# Patient Record
Sex: FEMALE | Race: WHITE | NOT HISPANIC OR LATINO | Employment: STUDENT | URBAN - METROPOLITAN AREA
[De-identification: names, ages, dates, MRNs, and addresses within clinical notes are randomized per-mention and may not be internally consistent; named-entity substitution may affect disease eponyms.]

---

## 2017-08-23 DIAGNOSIS — Z01.810 PRE-OPERATIVE CARDIOVASCULAR EXAMINATION: ICD-10-CM

## 2017-08-23 DIAGNOSIS — Z01.812 PRE-OPERATIVE LABORATORY EXAMINATION: ICD-10-CM

## 2017-08-23 LAB
ALBUMIN SERPL BCP-MCNC: 4.1 G/DL (ref 3.2–4.9)
ALBUMIN/GLOB SERPL: 1.3 G/DL
ALP SERPL-CCNC: 65 U/L (ref 30–99)
ALT SERPL-CCNC: 37 U/L (ref 2–50)
ANION GAP SERPL CALC-SCNC: 7 MMOL/L (ref 0–11.9)
APPEARANCE UR: CLEAR
AST SERPL-CCNC: 30 U/L (ref 12–45)
BASOPHILS # BLD AUTO: 0.4 % (ref 0–1.8)
BASOPHILS # BLD: 0.02 K/UL (ref 0–0.12)
BILIRUB SERPL-MCNC: 0.4 MG/DL (ref 0.1–1.5)
BILIRUB UR QL STRIP.AUTO: NEGATIVE
BUN SERPL-MCNC: 14 MG/DL (ref 8–22)
CALCIUM SERPL-MCNC: 9.7 MG/DL (ref 8.5–10.5)
CHLORIDE SERPL-SCNC: 106 MMOL/L (ref 96–112)
CO2 SERPL-SCNC: 25 MMOL/L (ref 20–33)
COLOR UR: YELLOW
CREAT SERPL-MCNC: 0.87 MG/DL (ref 0.5–1.4)
CULTURE IF INDICATED INDCX: NO UA CULTURE
EKG IMPRESSION: NORMAL
EOSINOPHIL # BLD AUTO: 0.09 K/UL (ref 0–0.51)
EOSINOPHIL NFR BLD: 1.8 % (ref 0–6.9)
ERYTHROCYTE [DISTWIDTH] IN BLOOD BY AUTOMATED COUNT: 46.6 FL (ref 35.9–50)
GFR SERPL CREATININE-BSD FRML MDRD: >60 ML/MIN/1.73 M 2
GLOBULIN SER CALC-MCNC: 3.2 G/DL (ref 1.9–3.5)
GLUCOSE SERPL-MCNC: 90 MG/DL (ref 65–99)
GLUCOSE UR STRIP.AUTO-MCNC: NEGATIVE MG/DL
HCT VFR BLD AUTO: 44.2 % (ref 37–47)
HGB BLD-MCNC: 14.8 G/DL (ref 12–16)
IMM GRANULOCYTES # BLD AUTO: 0.01 K/UL (ref 0–0.11)
IMM GRANULOCYTES NFR BLD AUTO: 0.2 % (ref 0–0.9)
KETONES UR STRIP.AUTO-MCNC: NEGATIVE MG/DL
LEUKOCYTE ESTERASE UR QL STRIP.AUTO: NEGATIVE
LYMPHOCYTES # BLD AUTO: 1.84 K/UL (ref 1–4.8)
LYMPHOCYTES NFR BLD: 37.5 % (ref 22–41)
MCH RBC QN AUTO: 32 PG (ref 27–33)
MCHC RBC AUTO-ENTMCNC: 33.5 G/DL (ref 33.6–35)
MCV RBC AUTO: 95.7 FL (ref 81.4–97.8)
MICRO URNS: NORMAL
MONOCYTES # BLD AUTO: 0.66 K/UL (ref 0–0.85)
MONOCYTES NFR BLD AUTO: 13.4 % (ref 0–13.4)
NEUTROPHILS # BLD AUTO: 2.29 K/UL (ref 2–7.15)
NEUTROPHILS NFR BLD: 46.7 % (ref 44–72)
NITRITE UR QL STRIP.AUTO: NEGATIVE
NRBC # BLD AUTO: 0 K/UL
NRBC BLD AUTO-RTO: 0 /100 WBC
PH UR STRIP.AUTO: 6 [PH]
PLATELET # BLD AUTO: 184 K/UL (ref 164–446)
PMV BLD AUTO: 10 FL (ref 9–12.9)
POTASSIUM SERPL-SCNC: 4 MMOL/L (ref 3.6–5.5)
PROT SERPL-MCNC: 7.3 G/DL (ref 6–8.2)
PROT UR QL STRIP: NEGATIVE MG/DL
RBC # BLD AUTO: 4.62 M/UL (ref 4.2–5.4)
RBC UR QL AUTO: NEGATIVE
SODIUM SERPL-SCNC: 138 MMOL/L (ref 135–145)
SP GR UR STRIP.AUTO: 1.02
UROBILINOGEN UR STRIP.AUTO-MCNC: 0.2 MG/DL
WBC # BLD AUTO: 4.9 K/UL (ref 4.8–10.8)

## 2017-08-23 PROCEDURE — 85025 COMPLETE CBC W/AUTO DIFF WBC: CPT

## 2017-08-23 PROCEDURE — 93005 ELECTROCARDIOGRAM TRACING: CPT

## 2017-08-23 PROCEDURE — 81003 URINALYSIS AUTO W/O SCOPE: CPT

## 2017-08-23 PROCEDURE — 80053 COMPREHEN METABOLIC PANEL: CPT

## 2017-08-23 PROCEDURE — 36415 COLL VENOUS BLD VENIPUNCTURE: CPT

## 2017-08-23 PROCEDURE — 93010 ELECTROCARDIOGRAM REPORT: CPT | Performed by: INTERNAL MEDICINE

## 2017-08-23 RX ORDER — M-VIT,TX,IRON,MINS/CALC/FOLIC 27MG-0.4MG
1 TABLET ORAL DAILY
COMMUNITY

## 2017-08-23 RX ORDER — LEVOCETIRIZINE DIHYDROCHLORIDE 5 MG/1
TABLET, FILM COATED ORAL DAILY
COMMUNITY

## 2017-08-23 RX ORDER — CETIRIZINE HYDROCHLORIDE 10 MG/1
10 TABLET ORAL DAILY
COMMUNITY

## 2017-09-05 NOTE — H&P
DATE OF ADMISSION:  2017    HISTORY OF PRESENT ILLNESS:  1.  The patient is a  46-year-old female who is currently being   admitted for a total laparoscopic hysterectomy, bilateral salpingectomy with   preservation of her ovaries.  Indications for surgery are debilitating cramps   of years' duration.  2.  Abnormal uterine bleeding, heavy menorrhagia and heavy flow and frequent   spotting throughout her cycle.  She had dysfunctional bleeding, dysmenorrhea   since the ultrasound showing probable adenomyosis.  Uterus is enlarged as   well.  Measurements on the uterus by ultrasound were 13.3x6.2x6.8.  Patient   has had 8 pregnancies and 5 deliveries.  Ovaries appeared normal on   ultrasound.  Risks of surgery have been discussed at length and in detail   including the possibility of bleeding, infection, damage to adjacent   structures, bowel, bladder, ureter, major blood vessels, possibility of   transfusion, anesthetic risk, thromboembolic risk.  Because of the suggestion   of adenomyosis, the possibility of endometriosis was discussed as well and if   there is severe endometriosis, ovaries would be removed.  Patient has   consented to this.  She only wants her ovaries removed; however, there are   special circumstances such as extreme endometriosis.  Transfusion, anesthetic   risk, and thromboembolic risk have also been discussed.  Possibility of   laparotomy has been discussed.  She had a previous appendectomy and ovarian   cystectomy to her right lower quadrant incision.  She has also had previous   tubal ligation.  She denies other abdominal surgeries.  She delivered 5   children.    PAST MEDICAL  HISTORY:  Significant really just for her surgeries,   appendectomy, cystectomy, tonsillectomy, tubal ligation.    REPRODUCTIVE HISTORY:   8, para 5.    FAMILY HISTORY:  Colon cancer, heart disease, stroke.    REVIEW OF SYSTEMS:  Otherwise, unremarkable.    PHYSICAL EXAMINATION:  GENERAL:   Well-developed, pleasant female, in no acute distress.  HEENT:  Normocephalic.  EOMs intact.  Pupils reactive to light.  Throat clear.  NECK:  Supple without adenopathy.  CHEST:  Clear to auscultation.  CARDIAC:  Regular rhythm, no murmurs.  BREASTS:  No masses, no dimpling, no retraction or axillary adenopathy.  ABDOMEN:  Soft, nontender.  No organomegaly.  EXTREMITIES:  Normal.  PELVIC:  Marital introitus, grade I rectocele, grade II cystocele.  Uterus is   enlarged.  No distinct adnexal masses were appreciated.    IMPRESSION:  Abnormal uterine bleeding, dysmenorrhea, suspected adenomyosis,   possible endometriosis, pelvic pain.    PLAN:  Patient is going to undergo the above referenced surgery, total   laparoscopic hysterectomy using da Bronson robotic technology.  Alternatives   have been discussed including IUD and ablation.  The patient wishes definitive   therapy.  Ovaries will only be removed if endometriosis is significant.       ____________________________________     MD SHALONDA BALDERAS / SNEHAL    DD:  09/05/2017 09:19:57  DT:  09/05/2017 11:34:41    D#:  [id number]  Job#:  [id number]

## 2017-09-06 ENCOUNTER — HOSPITAL ENCOUNTER (OUTPATIENT)
Facility: MEDICAL CENTER | Age: 47
End: 2017-09-06
Attending: OBSTETRICS & GYNECOLOGY | Admitting: OBSTETRICS & GYNECOLOGY
Payer: OTHER GOVERNMENT

## 2017-09-06 VITALS
RESPIRATION RATE: 16 BRPM | DIASTOLIC BLOOD PRESSURE: 57 MMHG | BODY MASS INDEX: 24.95 KG/M2 | WEIGHT: 168.43 LBS | SYSTOLIC BLOOD PRESSURE: 112 MMHG | HEART RATE: 62 BPM | OXYGEN SATURATION: 97 % | HEIGHT: 69 IN | TEMPERATURE: 97.3 F

## 2017-09-06 PROBLEM — N92.0 MENORRHAGIA: Status: ACTIVE | Noted: 2017-09-06

## 2017-09-06 LAB
B-HCG FREE SERPL-ACNC: <5 MIU/ML
HCG UR QL: NEGATIVE
IHCGL IHCGL: NEGATIVE MIU/ML
SP GR UR REFRACTOMETRY: 1.01

## 2017-09-06 PROCEDURE — 502240 HCHG MISC OR SUPPLY RC 0272: Performed by: OBSTETRICS & GYNECOLOGY

## 2017-09-06 PROCEDURE — 84702 CHORIONIC GONADOTROPIN TEST: CPT

## 2017-09-06 PROCEDURE — 160048 HCHG OR STATISTICAL LEVEL 1-5: Performed by: OBSTETRICS & GYNECOLOGY

## 2017-09-06 PROCEDURE — 81025 URINE PREGNANCY TEST: CPT

## 2017-09-06 PROCEDURE — 160035 HCHG PACU - 1ST 60 MINS PHASE I: Performed by: OBSTETRICS & GYNECOLOGY

## 2017-09-06 PROCEDURE — 160031 HCHG SURGERY MINUTES - 1ST 30 MINS LEVEL 5: Performed by: OBSTETRICS & GYNECOLOGY

## 2017-09-06 PROCEDURE — 700104 HCHG RX REV CODE 254

## 2017-09-06 PROCEDURE — 160046 HCHG PACU - 1ST 60 MINS PHASE II: Performed by: OBSTETRICS & GYNECOLOGY

## 2017-09-06 PROCEDURE — 700111 HCHG RX REV CODE 636 W/ 250 OVERRIDE (IP)

## 2017-09-06 PROCEDURE — 160009 HCHG ANES TIME/MIN: Performed by: OBSTETRICS & GYNECOLOGY

## 2017-09-06 PROCEDURE — 501577 HCHG TROCAR, STEP 11MM: Performed by: OBSTETRICS & GYNECOLOGY

## 2017-09-06 PROCEDURE — 700101 HCHG RX REV CODE 250

## 2017-09-06 PROCEDURE — 160036 HCHG PACU - EA ADDL 30 MINS PHASE I: Performed by: OBSTETRICS & GYNECOLOGY

## 2017-09-06 PROCEDURE — 88307 TISSUE EXAM BY PATHOLOGIST: CPT

## 2017-09-06 PROCEDURE — 160042 HCHG SURGERY MINUTES - EA ADDL 1 MIN LEVEL 5: Performed by: OBSTETRICS & GYNECOLOGY

## 2017-09-06 PROCEDURE — 160025 RECOVERY II MINUTES (STATS): Performed by: OBSTETRICS & GYNECOLOGY

## 2017-09-06 PROCEDURE — 501838 HCHG SUTURE GENERAL: Performed by: OBSTETRICS & GYNECOLOGY

## 2017-09-06 PROCEDURE — 502714 HCHG ROBOTIC SURGERY SERVICES: Performed by: OBSTETRICS & GYNECOLOGY

## 2017-09-06 PROCEDURE — 502000 HCHG MISC OR IMPLANTS RC 0278: Performed by: OBSTETRICS & GYNECOLOGY

## 2017-09-06 PROCEDURE — 500025 HCHG ARMREST, CRADLE FOAM: Performed by: OBSTETRICS & GYNECOLOGY

## 2017-09-06 PROCEDURE — 160047 HCHG PACU  - EA ADDL 30 MINS PHASE II: Performed by: OBSTETRICS & GYNECOLOGY

## 2017-09-06 PROCEDURE — 160002 HCHG RECOVERY MINUTES (STAT): Performed by: OBSTETRICS & GYNECOLOGY

## 2017-09-06 PROCEDURE — A9270 NON-COVERED ITEM OR SERVICE: HCPCS

## 2017-09-06 PROCEDURE — 500854 HCHG NEEDLE, INSUFFLATION FOR STEP: Performed by: OBSTETRICS & GYNECOLOGY

## 2017-09-06 PROCEDURE — 500002 HCHG ADHESIVE, DERMABOND: Performed by: OBSTETRICS & GYNECOLOGY

## 2017-09-06 PROCEDURE — 502679 HCHG MANIPULATOR, UTRN DAVINCI: Performed by: OBSTETRICS & GYNECOLOGY

## 2017-09-06 PROCEDURE — 700102 HCHG RX REV CODE 250 W/ 637 OVERRIDE(OP)

## 2017-09-06 PROCEDURE — 501330 HCHG SET, CYSTO IRRIG TUBING: Performed by: OBSTETRICS & GYNECOLOGY

## 2017-09-06 DEVICE — TISSEEL 4ML ----MUST ORDER A MIN OF 6EA----: Type: IMPLANTABLE DEVICE | Status: FUNCTIONAL

## 2017-09-06 RX ORDER — ONDANSETRON 2 MG/ML
INJECTION INTRAMUSCULAR; INTRAVENOUS
Status: COMPLETED
Start: 2017-09-06 | End: 2017-09-06

## 2017-09-06 RX ORDER — OXYCODONE HYDROCHLORIDE AND ACETAMINOPHEN 5; 325 MG/1; MG/1
1 TABLET ORAL
Status: DISCONTINUED | OUTPATIENT
Start: 2017-09-06 | End: 2017-09-06 | Stop reason: HOSPADM

## 2017-09-06 RX ORDER — LIDOCAINE HYDROCHLORIDE 10 MG/ML
0.5 INJECTION, SOLUTION INFILTRATION; PERINEURAL
Status: COMPLETED | OUTPATIENT
Start: 2017-09-06 | End: 2017-09-06

## 2017-09-06 RX ORDER — LIDOCAINE AND PRILOCAINE 25; 25 MG/G; MG/G
1 CREAM TOPICAL
Status: COMPLETED | OUTPATIENT
Start: 2017-09-06 | End: 2017-09-06

## 2017-09-06 RX ORDER — SODIUM CHLORIDE, SODIUM LACTATE, POTASSIUM CHLORIDE, CALCIUM CHLORIDE 600; 310; 30; 20 MG/100ML; MG/100ML; MG/100ML; MG/100ML
INJECTION, SOLUTION INTRAVENOUS CONTINUOUS
Status: DISCONTINUED | OUTPATIENT
Start: 2017-09-06 | End: 2017-09-06 | Stop reason: HOSPADM

## 2017-09-06 RX ORDER — ONDANSETRON 2 MG/ML
4 INJECTION INTRAMUSCULAR; INTRAVENOUS EVERY 6 HOURS PRN
Status: DISCONTINUED | OUTPATIENT
Start: 2017-09-06 | End: 2017-09-06 | Stop reason: HOSPADM

## 2017-09-06 RX ORDER — METOCLOPRAMIDE HYDROCHLORIDE 5 MG/ML
10 INJECTION INTRAMUSCULAR; INTRAVENOUS EVERY 4 HOURS PRN
Status: DISCONTINUED | OUTPATIENT
Start: 2017-09-06 | End: 2017-09-06 | Stop reason: HOSPADM

## 2017-09-06 RX ORDER — LIDOCAINE HYDROCHLORIDE 10 MG/ML
INJECTION, SOLUTION INFILTRATION; PERINEURAL
Status: COMPLETED
Start: 2017-09-06 | End: 2017-09-06

## 2017-09-06 RX ORDER — OXYCODONE HYDROCHLORIDE AND ACETAMINOPHEN 5; 325 MG/1; MG/1
TABLET ORAL
Status: COMPLETED
Start: 2017-09-06 | End: 2017-09-06

## 2017-09-06 RX ORDER — KETOROLAC TROMETHAMINE 30 MG/ML
30 INJECTION, SOLUTION INTRAMUSCULAR; INTRAVENOUS
Status: DISCONTINUED | OUTPATIENT
Start: 2017-09-06 | End: 2017-09-06 | Stop reason: HOSPADM

## 2017-09-06 RX ADMIN — OXYCODONE AND ACETAMINOPHEN 1 TABLET: 5; 325 TABLET ORAL at 10:11

## 2017-09-06 RX ADMIN — SODIUM CHLORIDE, SODIUM LACTATE, POTASSIUM CHLORIDE, CALCIUM CHLORIDE: 600; 310; 30; 20 INJECTION, SOLUTION INTRAVENOUS at 06:50

## 2017-09-06 RX ADMIN — FENTANYL CITRATE 50 MCG: 50 INJECTION, SOLUTION INTRAMUSCULAR; INTRAVENOUS at 10:12

## 2017-09-06 RX ADMIN — OXYCODONE AND ACETAMINOPHEN 1 TABLET: 5; 325 TABLET ORAL at 10:52

## 2017-09-06 RX ADMIN — LIDOCAINE HYDROCHLORIDE 0.5 ML: 10 INJECTION, SOLUTION INFILTRATION; PERINEURAL at 06:50

## 2017-09-06 RX ADMIN — FENTANYL CITRATE 50 MCG: 50 INJECTION, SOLUTION INTRAMUSCULAR; INTRAVENOUS at 11:42

## 2017-09-06 RX ADMIN — FENTANYL CITRATE 50 MCG: 50 INJECTION, SOLUTION INTRAMUSCULAR; INTRAVENOUS at 10:53

## 2017-09-06 RX ADMIN — ONDANSETRON 4 MG: 2 INJECTION INTRAMUSCULAR; INTRAVENOUS at 10:44

## 2017-09-06 ASSESSMENT — PAIN SCALES - GENERAL
PAINLEVEL_OUTOF10: 2
PAINLEVEL_OUTOF10: 0
PAINLEVEL_OUTOF10: 3
PAINLEVEL_OUTOF10: 5
PAINLEVEL_OUTOF10: 2
PAINLEVEL_OUTOF10: 2
PAINLEVEL_OUTOF10: 4
PAINLEVEL_OUTOF10: 2

## 2017-09-06 NOTE — OP REPORT
DATE OF SERVICE:  09/06/2017    PREOPERATIVE DIAGNOSES:  Severe dysmenorrhea cramping and disabling   menorrhagia, suspected adenomyosis on ultrasound, enlarged leiomyomatous   uterus.    POSTOPERATIVE DIAGNOSES:  Severe dysmenorrhea cramping and disabling   menorrhagia, suspected adenomyosis on ultrasound, enlarged leiomyomatous   uterus.    OPERATION PERFORMED:  Total laparoscopic hysterectomy, bilateral   salpingectomy, and cystoscopy.    SURGEON:  Ac Gill MD.    ASSISTANT:   Farideh Espinal CST.    ANESTHESIOLOGIST:  Aime Lemus MD.    INDICATIONS:  As above.    PROCEDURE:  Patient was taken to the operating room and placed on the   operating table and successfully given a general anesthetic.  She was placed   in the dorsal lithotomy position, given a vaginal, perineal and abdominal prep   in the usual fashion.  At this juncture, she was draped appropriately and the   VCare uterine manipulator device was placed by Farideh Espinal and sutured   into place.  At this juncture, uterus was elevated and was very close to the   umbilicus.  Because of that, we went higher using the Xi robot.  Initial   incision was probably 3 inches above the umbilicus.  Small incision was made.    Veress needle was inserted without difficulty.  Pneumoperitoneum was created   with low pressures.  Once this was accomplished, the 8 mm trocar was placed   without difficulty.  All other trocars were placed under direct visualization   starting with the left lower quadrant and then the 2 lateral ports.  Once that   was accomplished, docking was accomplished by targeting the camera port,   machine rotated appropriately and then all arms were attached.  The arms were   attached once the camera targeted the anatomy desired, which was the uterus.    Once this was accomplished, the procedure was initiated.  The uterus was quite   enlarged.  There was scarring with the cecum to the right pelvic sidewall   from previous surgery,  which was appendectomy and ovarian cystectomy.  This   did not interfere; however, with access.  The cul-de-sac showed evidence of   endometriosis.  There was marked vascularity everywhere.  Once the anatomy was   assessed, we went ahead and initiated the procedure.  The left ovary was   attached to the uterine body and had to be cauterized and dissected free, but   was preserved.  The left fallopian tube was removed by cauterization of the   mesosalpinx and removal.  Once this was accomplished essentially the uterine   ovarian ligament had already been incised because the ovary was attached to   the uterus.  Once that was accomplished, the leaves of the broad ligament were    and a very large prolific amount of vessels feeding the enlarged   uterus were individually cauterized and reflected laterally.  Once that was   accomplished, the anterior peritoneum overlying the bladder was incised.    Bladder was taken off the lower uterine segment.  Posterior peritoneum was   incised.  Again, vessels were cauterized and reflected laterally as   encountered.  Once this was accomplished, we initiated the procedure on the   contralateral side, which was the right side.  The fallopian tube was   initially removed by cauterization of the mesosalpinx and removal.  The ovary   was left intact by cauterizing the uterine ovarian ligament reflecting this   laterally.  This allowed opening of the leaves of the broad ligament, which   exposed all of the vessels again which were quite prolific.  These were   individually cauterized and reflected laterally.  Once this was accomplished,   the anterior peritoneum was again incised and the bladder was taken well off   the lower uterine segment below the level of the VCare ring.  Similar   procedure was carried out posteriorly.  Once all of this had accomplished, we   were able to enter anteriorly, which allowed exposure of the V-Care ring,   which was followed around using the  Harmonic scalpel to cauterize and cut the   vaginal mucosa until the entire cervix was free from the vagina.  Once this   was accomplished, the specimen was removed through the vagina.  Angle sutures   were placed with 0 Vicryl at 3 and 9 o'clock and the mid portion of the   vaginal cuff was closed with a Quill suture and reinforced.  Tisseel was   placed on the cuff, which was not bleeding at all.  At this juncture,   irrigation was carried out.  Instruments were removed and all incisions were   closed with a deep stitch in the subcutaneous tissue and Dermabond.    Cystoscopy was performed.  Indigo carmine dye was seen to flow freely from   both ureteral meatuses and the bladder was felt to be totally normal with no   evidence of any trauma.  There were no complications.    ESTIMATED BLOOD LOSS:  Minimal maybe 50 mL.    Patient was taken to recovery room in stable condition.  Counts were reported   correct at the conclusion of the case.       ____________________________________     MD SHALONDA BALDERAS / SNEHAL    DD:  09/06/2017 10:09:03  DT:  09/06/2017 10:51:24    D#:  [id number]  Job#:  [id number]

## 2017-09-06 NOTE — DISCHARGE INSTRUCTIONS
ACTIVITY: Rest and take it easy for the first 24 hours.  A responsible adult is recommended to remain with you during that time.  It is normal to feel sleepy.  We encourage you to not do anything that requires balance, judgment or coordination.    MILD FLU-LIKE SYMPTOMS ARE NORMAL. YOU MAY EXPERIENCE GENERALIZED MUSCLE ACHES, THROAT IRRITATION, HEADACHE AND/OR SOME NAUSEA.    FOR 24 HOURS DO NOT:  Drive, operate machinery or run household appliances.  Drink beer or alcoholic beverages.   Make important decisions or sign legal documents.    SPECIAL INSTRUCTIONS:   Follow any special instructions given to you by Dr. Gill  Advance diet as tolerated: start with clear liquids only until passing gas  Pelvic rest for 6 wks or until MD gives approval  No tub baths, swimming or hot tubs for 6 weeks  Keep bowels soft and regular - May use Milk of Magnesia as needed for constipation; May use Colace over the counter 2 Xs daily as needed for constipation    DIET: To avoid nausea, slowly advance diet as tolerated, avoiding spicy or greasy foods for the first day.  Add more substantial food to your diet according to your physician's instructions.  Babies can be fed formula or breast milk as soon as they are hungry.  INCREASE FLUIDS AND FIBER TO AVOID CONSTIPATION.    SURGICAL DRESSING/BATHING: *okay to shower; no bathing*  clear liquids only until passing gas    FOLLOW-UP APPOINTMENT:  A follow-up appointment should be arranged with your doctor in *2 weeks*; call to schedule.    You should CALL YOUR PHYSICIAN if you develop:  Fever greater than 101 degrees F.  Pain not relieved by medication, or persistent nausea or vomiting.  Excessive bleeding (blood soaking through dressing) or unexpected drainage from the wound.  Extreme redness or swelling around the incision site, drainage of pus or foul smelling drainage.  Inability to urinate or empty your bladder within 8 hours.  Problems with breathing or chest pain.    You  should call 911 if you develop problems with breathing or chest pain.  If you are unable to contact your doctor or surgical center, you should go to the nearest emergency room or urgent care center.  Physician's telephone #: *Dr. Gill [phone]*    If any questions arise, call your doctor.  If your doctor is not available, please feel free to call the Surgical Center at [phone].  The Center is open Monday through Friday from 7AM to 7PM.  You can also call the HEALTH HOTLINE open 24 hours/day, 7 days/week and speak to a nurse at [phone], or toll free at [phone].    A registered nurse may call you a few days after your surgery to see how you are doing after your procedure.    MEDICATIONS: Resume taking daily medication.  Take prescribed pain medication with food.  If no medication is prescribed, you may take non-aspirin pain medication if needed.  PAIN MEDICATION CAN BE VERY CONSTIPATING.  Take a stool softener or laxative such as senokot, pericolace, or milk of magnesia if needed.    Prescription given for *oxycodone, zofran, reglan*.  Last pain medication given at *10:52am*.    If your physician has prescribed pain medication that includes Acetaminophen (Tylenol), do not take additional Acetaminophen (Tylenol) while taking the prescribed medication.    Depression / Suicide Risk    As you are discharged from this [facility] facility, it is important to learn how to keep safe from harming yourself.    Recognize the warning signs:  · Abrupt changes in personality, positive or negative- including increase in energy   · Giving away possessions  · Change in eating patterns- significant weight changes-  positive or negative  · Change in sleeping patterns- unable to sleep or sleeping all the time   · Unwillingness or inability to communicate  · Depression  · Unusual sadness, discouragement and loneliness  · Talk of wanting to die  · Neglect of personal appearance   · Rebelliousness- reckless  behavior  · Withdrawal from people/activities they love  · Confusion- inability to concentrate     If you or a loved one observes any of these behaviors or has concerns about self-harm, here's what you can do:  · Talk about it- your feelings and reasons for harming yourself  · Remove any means that you might use to hurt yourself (examples: pills, rope, extension cords, firearm)  · Get professional help from the community (Mental Health, Substance Abuse, psychological counseling)  · Do not be alone:Call your Safe Contact- someone whom you trust who will be there for you.  · Call your local CRISIS HOTLINE 923-1915 or 591-464-9899  · Call your local Children's Mobile Crisis Response Team [place] (852) 225-2652 or www.[employer]  · Call the toll free National Suicide Prevention Hotlines   · National Suicide Prevention Lifeline 956-558-LUDR (9727)  · National Hope Line Network 800-SUICIDE (560-1894)

## 2017-09-06 NOTE — OR SURGEON
Operative Report    PreOp Diagnosis: Menorrhagia, dysmenorrea, enlarged uterus, adenomyosis    PostOp Diagnosis: same pending pathology    Procedure(s):  HYSTERECTOMY ROBOTIC XI WITH GOPI SALPINGECTOMY - Wound Class: Clean Contaminated    Surgeon(s):  Ac Gill M.D.    Anesthesiologist/Type of Anesthesia:  Anesthesiologist: Aime Lemus Jr., M.D./General    Surgical Staff:  Circulator: Wilman Dunham R.N.; Carmen Doran R.N.  Relief Circulator: Misael Avalos R.N.  Scrub Person: Tino Jackman Assist: Farideh Espinal    Specimens:  Uterus and fallopian tubes    Estimated Blood Loss: 50ml    Findings: Enlarged uterus, endometriosis, pelvic adhesions, Normal bladder and patent ureters post op at cystoscopy    Complications: None        9/6/2017 10:16 AM Ac Gill

## 2025-03-17 NOTE — OR NURSING
Dr. Gill notified of PVR and minimal amount of void.  He would like for pt to be able to void more and to call prior to her discharge.  He was also informed of the infiltrated IV.  No new orders to replace at this time.  
IV to left forearm infiltrated.  Removed with tip intact.  No c/o pain by pt.  Forearm wrapped.  Will continue to monitor.  
Jerel COCHRAN'dilcia per MD orders.  
Per t/c to maryse Rojas for pt to discharge now.  
Pt bladder scanned for 45cc.  In the restroom trying to urinate.  Pt is just very dehydrated but taking orals without difficulty and otherwise ready to discharge.  Will notify Dr. Gill.  
Pt up to bathroom, voided very small amount.  PVR =13ml.  Will place call to Dr. Gill.  Pt eager for discharge.  Able to tolerate clear liquids without difficulty.  
Pt up to restroom in an attempt to void.  Unsuccessful at this time.  
Pt's  updated shortly after pt's arrival.  Dr. Gill by to update pt.  Pt on clears until passing gas.  Per pt rx's filled and in hotel room.  
Current medications

## (undated) DEVICE — SLEEVE, VASO, THIGH, MED

## (undated) DEVICE — MASK ANESTHESIA ADULT  - (100/CA)

## (undated) DEVICE — UTERINE MANIP V-CARE LARGE - (DAVINCI)  8/CA

## (undated) DEVICE — NEEDLE DRIVER LARGE DA VINCI 10X'S REUSABLE

## (undated) DEVICE — SUTURE 2-0 14CM X 14CM STRATAFIX SPIRAL PDO VIO MH NEEDLE (12/BX)

## (undated) DEVICE — ELECTRODE 850 FOAM ADHESIVE - HYDROGEL RADIOTRNSPRNT (50/PK)

## (undated) DEVICE — GLOVE BIOGEL INDICATOR SZ 8 SURGICAL PF LTX - (50/BX 4BX/CA)

## (undated) DEVICE — WATER IRRIG. STER 3000 ML - (4/CA)

## (undated) DEVICE — FORCEPS MARYLAND BIPOLAR DA VINCI 10X'S REUSABLE

## (undated) DEVICE — HEAD HOLDER JUNIOR/ADULT

## (undated) DEVICE — GOWN WARMING STANDARD FLEX - (30/CA)

## (undated) DEVICE — SEAL 5MM-8MM UNIVERSAL  BOX OF 10

## (undated) DEVICE — NEEDLE INSUFFLATION FOR STEP - (12/BX)

## (undated) DEVICE — SET EXTENSION WITH 2 PORTS (48EA/CA) ***PART #2C8610 IS A SUBSTITUTE*****

## (undated) DEVICE — DRAPE COLUMN  BOX OF 20

## (undated) DEVICE — SET SUCTION/IRRIGATION WITH DISPOSABLE TIP (6/CA )PART #0250-070-520 IS A SUB

## (undated) DEVICE — SUTURE 0 VICRYL PLUS CT-2 - 27 INCH (36/BX)

## (undated) DEVICE — SET LEADWIRE 5 LEAD BEDSIDE DISPOSABLE ECG (1SET OF 5/EA)

## (undated) DEVICE — ARMREST CRADLE FOAM - (2PR/PK 12PR/CA)

## (undated) DEVICE — ROBOTIC SURGERY SERVICES

## (undated) DEVICE — TUBE E-T HI-LO CUFF 7.0MM (10EA/PK)

## (undated) DEVICE — OBTURATOR BLADELESS STANDARD 8MM (6EA/BX)

## (undated) DEVICE — TUBING CLEARLINK DUO-VENT - C-FLO (48EA/CA)

## (undated) DEVICE — NEPTUNE 4 PORT MANIFOLD - (20/PK)

## (undated) DEVICE — GLOVE, LITE (PAIR)

## (undated) DEVICE — TROCAR Z THREAD11MM OPTICAL - NON BLADED(6/BX)

## (undated) DEVICE — DRAPE ARM  BOX OF 20

## (undated) DEVICE — SUTURE GENERAL

## (undated) DEVICE — KIT ANESTHESIA W/CIRCUIT & 3/LT BAG W/FILTER (20EA/CA)

## (undated) DEVICE — ELECTRODE DUAL RETURN W/ CORD - (50/PK)

## (undated) DEVICE — PAD OR TABLE DA VINCI 2IN X 20IN X 72IN - (12EA/CA)

## (undated) DEVICE — WATER IRRIG. STER. 1500 ML - (9/CA)

## (undated) DEVICE — CANISTER SUCTION 3000ML MECHANICAL FILTER AUTO SHUTOFF MEDI-VAC NONSTERILE LF DISP  (40EA/CA)

## (undated) DEVICE — GLOVE BIOGEL PI INDICATOR SZ 8.0 SURGICAL PF LF -(50/BX 4BX/CA)

## (undated) DEVICE — ADHESIVE DERMABOND HVD MINI (12EA/BX)

## (undated) DEVICE — SUTURE 2-0 VICRYL PLUS CT-2 - 27 INCH (36/BX)

## (undated) DEVICE — GOWN SURGEONS X-LARGE - DISP. (30/CA)

## (undated) DEVICE — LACTATED RINGERS INJ 1000 ML - (14EA/CA 60CA/PF)

## (undated) DEVICE — SYRINGE 30 ML LS (56/BX)

## (undated) DEVICE — SUCTION INSTRUMENT YANKAUER BULBOUS TIP W/O VENT (50EA/CA)

## (undated) DEVICE — SENSOR SPO2 NEO LNCS ADHESIVE (20/BX) SEE USER NOTES

## (undated) DEVICE — GLOVE BIOGEL SZ 7.5 SURGICAL PF LTX - (50PR/BX 4BX/CA)

## (undated) DEVICE — APPLICATOR DUPLO SPRAYER (5EA/CA)

## (undated) DEVICE — PACK GYN DAVINCI (2EA/CA)

## (undated) DEVICE — KIT ROOM DECONTAMINATION

## (undated) DEVICE — SET IRRIGATION CYSTOSCOPY TUBE L80 IN (20EA/CA)

## (undated) DEVICE — GLOVE BIOGEL PI ORTHO SZ 7.5 PF LF (40PR/BX)

## (undated) DEVICE — GLOVE BIOGEL SZ 6.5 SURGICAL PF LTX (50PR/BX 4BX/CA)